# Patient Record
Sex: FEMALE | Race: WHITE | URBAN - METROPOLITAN AREA
[De-identification: names, ages, dates, MRNs, and addresses within clinical notes are randomized per-mention and may not be internally consistent; named-entity substitution may affect disease eponyms.]

---

## 2025-02-10 ENCOUNTER — ANCILLARY PROCEDURE (OUTPATIENT)
Dept: URGENT CARE | Age: 45
End: 2025-02-10
Payer: COMMERCIAL

## 2025-02-10 ENCOUNTER — OFFICE VISIT (OUTPATIENT)
Dept: URGENT CARE | Age: 45
End: 2025-02-10
Payer: COMMERCIAL

## 2025-02-10 VITALS
HEART RATE: 88 BPM | OXYGEN SATURATION: 98 % | WEIGHT: 293 LBS | DIASTOLIC BLOOD PRESSURE: 86 MMHG | TEMPERATURE: 97.2 F | SYSTOLIC BLOOD PRESSURE: 132 MMHG | RESPIRATION RATE: 16 BRPM

## 2025-02-10 DIAGNOSIS — S83.92XA SPRAIN OF LEFT KNEE/LEG, INITIAL ENCOUNTER: ICD-10-CM

## 2025-02-10 DIAGNOSIS — S89.92XA INJURY OF LEFT KNEE, INITIAL ENCOUNTER: Primary | ICD-10-CM

## 2025-02-10 DIAGNOSIS — S89.92XA INJURY OF LEFT KNEE, INITIAL ENCOUNTER: ICD-10-CM

## 2025-02-10 PROCEDURE — 99204 OFFICE O/P NEW MOD 45 MIN: CPT | Performed by: FAMILY MEDICINE

## 2025-02-10 PROCEDURE — 73560 X-RAY EXAM OF KNEE 1 OR 2: CPT | Mod: LEFT SIDE | Performed by: FAMILY MEDICINE

## 2025-02-10 RX ORDER — SERTRALINE HYDROCHLORIDE 100 MG/1
1 TABLET, FILM COATED ORAL
COMMUNITY
Start: 2025-02-01

## 2025-02-10 RX ORDER — NAPROXEN 500 MG/1
500 TABLET ORAL 2 TIMES DAILY PRN
Qty: 20 TABLET | Refills: 0 | Status: SHIPPED | OUTPATIENT
Start: 2025-02-10 | End: 2025-02-20

## 2025-02-10 RX ORDER — FLUTICASONE PROPIONATE AND SALMETEROL XINAFOATE 45; 21 UG/1; UG/1
AEROSOL, METERED RESPIRATORY (INHALATION)
COMMUNITY
Start: 2024-06-18

## 2025-02-10 NOTE — LETTER
February 10, 2025     Patient: Eva Franks   YOB: 1980   Date of Visit: 2/10/2025       To Whom It May Concern:    Eva Franks was seen in my clinic on 2/10/2025 at 3:15 pm. Please excuse Eva for her absence from work on this day to make the appointment.  Return to work 2/12/2025    If you have any questions or concerns, please don't hesitate to call.         Sincerely,         Zafar Broderick MD        CC: No Recipients

## 2025-02-10 NOTE — PROGRESS NOTES
"Subjective   Patient ID: Eva Franks is a 44 y.o. female. They present today with a chief complaint of Knee Injury (Left, yesterday, stepped down off of step work, back of knee down to foot, burning/stabbing pain, swelling. ).    History of Present Illness  HPI  Patient presents with left knee pain since yesterday.  She states that she stepped down off of a step yesterday and felt pain.  The pain is in the back of her knee down to her foot and she describes it as a stabbing burning pain some swelling noted.  Patient is taking ibuprofen, Tylenol and took a single 20 mg prednisone she \"had leftover\".  Pain is worse with walking.  She states pain radiates down her posterior left calf to her left ankle but she denies any overt injury to the ankle.  Some tingling noted in left second and third toes with mildly decreased sensation in tips of second third and fourth toes.  Past Medical History  Allergies as of 02/10/2025 - Reviewed 02/10/2025   Allergen Reaction Noted    Codeine Unknown 06/05/2023    Meperidine Unknown 06/05/2023       (Not in a hospital admission)       Past Medical History:   Diagnosis Date    Other specified health status     No pertinent past medical history       No past surgical history on file.     reports that she has been smoking cigarettes. She has never used smokeless tobacco.    Review of Systems  Review of Systems     As in history of present illness                          Objective    Vitals:    02/10/25 1536   BP: 132/86   Pulse: 88   Resp: 16   Temp: 36.2 °C (97.2 °F)   SpO2: 98%   Weight: 147 kg (325 lb)     No LMP recorded.    Physical Exam  General: Vitals noted, no distress. Afebrile.   Vascular: Left lower extremity warm and dry with good peripheral pulses noted.  Chronic varicosities noted across anterior left lower leg  Extremities: No peripheral edema.  No tenderness in left calf.  Negative Homans bilaterally, no cords.  Tenderness over left posterior upper calf and hamstrings. "  Patient able to flex left knee to 90 degrees and is able to extend it to 180 degrees.  Ankle exam shows no tenderness or swelling.  Skin: No rash. No bruising or discoloration. No cuts or abrasions  Neuro: No focal neurologic deficits, NIH score of 0.    Procedures    Point of Care Test & Imaging Results from this visit  No results found for this visit on 02/10/25.   No results found.    Diagnostic study results (if any) were reviewed by Zafar Broderick MD.    Assessment/Plan   Allergies, medications, history, and pertinent labs/EKGs/Imaging reviewed by Zafar Broderick MD.     Medical Decision Making  At time of discharge patient was clinically well-appearing and HDS for outpatient management. The patient and/or family was educated regarding diagnosis, supportive care, OTC and Rx medications. The patient and/or family was given the opportunity to ask questions prior to discharge.  They verbalized understanding of my discussion of the plans for treatment, expected course, indications to return to  or seek further evaluation in ED, and the need for timely follow up as directed.   They were provided with a work/school excuse if requested.    Orders and Diagnoses  Diagnoses and all orders for this visit:  Injury of left knee, initial encounter  Sprain of left knee/leg, initial encounter  -     naproxen (Naprosyn) 500 mg tablet; Take 1 tablet (500 mg) by mouth 2 times a day as needed for mild pain (1 - 3) or moderate pain (4 - 6) for up to 10 days.      Medical Admin Record      Patient disposition: Home    Electronically signed by Zafar Broderick MD  4:43 PM

## 2025-02-10 NOTE — PATIENT INSTRUCTIONS
Elevate and rest knee as able  Apply ice to affected area several times a day  Take naproxen twice a day with food as needed  May use Tylenol for additional pain relief  Follow-up with PCP in the next 5 to 7 days if not improving